# Patient Record
(demographics unavailable — no encounter records)

---

## 2025-06-12 NOTE — HISTORY OF PRESENT ILLNESS
[FreeTextEntry1] : Dear Citlali,  I had the pleasure of seeing your patient TACO MARY for Cardiometabolic evaluation.  As you know, she  is a Pleasant, 42 year old - Works in [employer] - with a past medical history of Obesity, Hyperlipidemia, ASCVD by Carotid imaging due to Irregular Calcific Plaque in p JERO < 50% =============== Total Cholesterol - 202 mg/dL; HDLc - 69 mg/dL; LDLc - 122 mg/dL; TG - 53 mg/dL   Lp (a) - nmol/L A1c - 5.5 ===============  Obesity, Hyperlipidemia, ASCVD by Carotid imaging due to Irregular Calcific Plaque in p JERO < 50% -  - Discussed diet and exercise at length - GLP1RA - Likely start Statin If Repeat Carotid shows the same (as was done outside)  Abnormal ECG - TTE   Irregular Calcific Plaque in p JERO < 50% - Monitor Bi-Yearly (If findings accurate)  ----------------------------  TTE --  - LVEF  Nl Only lost 10 lbs on 1.0 mg -> Going to 1.7 mg ----------------- ----------------- 5-2024 Venous doppler following leg inury Start Statin - Atorva 10 ----------------- -----------------  Insurance discontinued [employer] They want them to go through their own in-house providers Not been taking atorva consistently

## 2025-06-12 NOTE — DISCUSSION/SUMMARY
[FreeTextEntry1] : In summary   Pleasant, 42 year old - Works in [employer] - with a past medical history of Obesity, Hyperlipidemia, ASCVD by Carotid imaging due to Irregular Calcific Plaque in p JERO < 50% =============== TTE --  - LVEF  Nl Total Cholesterol - 202 mg/dL; HDLc - 69 mg/dL; LDLc - 122 mg/dL; TG - 53 mg/dL   Lp (a) - nmol/L A1c - 5.5 ===============  Obesity, Hyperlipidemia, ASCVD by Carotid imaging due to Irregular Calcific Plaque in p JERO < 50% - Discussed diet and exercise at length Insurance discontinued [employer] They want them to go through their own in-house providers Not been taking atorva consistently   Irregular Calcific Plaque in p JERO < 50% - Monitor Bi-Yearly (If findings accurate) - Atorva 10    Citlali, it was a pleasure to participate in the care of your patient.   With kind thanks for the referral.  Liban Puentes MD [facility] Director, Preventive Cardiology & Lipidology [facility] [facility]                                                                                                                                                                                                                                                                                                                                                Additional 15 minutes was provided for preventive counseling on healthy diet, weight maintenance, CV risk reduction. Specifically, and separate from other cardiovascular evaluation and treatment, we discussed TACO 's willingness to focus  on lifestyle changes, particularly dietary; including greater consumption of vegetables, fruits over saturated fats. We discussed appropriate follow up to monitor progress on these areas. We also discussed the importance of weight control to reduce any exacerbation of underlying conditions.

## 2025-06-12 NOTE — DISCUSSION/SUMMARY
[FreeTextEntry1] : In summary   Pleasant, 42 year old - Works in [employer] - with a past medical history of Obesity, Hyperlipidemia, ASCVD by Carotid imaging due to Irregular Calcific Plaque in p JERO < 50% =============== TTE --  - LVEF  Nl Total Cholesterol - 202 mg/dL; HDLc - 69 mg/dL; LDLc - 122 mg/dL; TG - 53 mg/dL   Lp (a) - nmol/L A1c - 5.5 ===============  Obesity, Hyperlipidemia, ASCVD by Carotid imaging due to Irregular Calcific Plaque in p JERO < 50% - Discussed diet and exercise at length Insurance discontinued [employer]gov[employer] They want them to go through their own in-house providers Not been taking atorva consistently   Irregular Calcific Plaque in p JERO < 50% - Monitor Bi-Yearly (If findings accurate) - Atorva 10    Citlali, it was a pleasure to participate in the care of your patient.   With kind thanks for the referral.  Liban Puentes MD [facility] [employer] Director, Preventive Cardiology & Lipidology [facility]                                                                                                                                                                                                                                                                                                                                                Additional 15 minutes was provided for preventive counseling on healthy diet, weight maintenance, CV risk reduction. Specifically, and separate from other cardiovascular evaluation and treatment, we discussed TACO 's willingness to focus  on lifestyle changes, particularly dietary; including greater consumption of vegetables, fruits over saturated fats. We discussed appropriate follow up to monitor progress on these areas. We also discussed the importance of weight control to reduce any exacerbation of underlying conditions. show